# Patient Record
Sex: FEMALE | HISPANIC OR LATINO | Employment: FULL TIME | URBAN - METROPOLITAN AREA
[De-identification: names, ages, dates, MRNs, and addresses within clinical notes are randomized per-mention and may not be internally consistent; named-entity substitution may affect disease eponyms.]

---

## 2017-09-19 ENCOUNTER — OCCUPATIONAL MEDICINE (OUTPATIENT)
Dept: OCCUPATIONAL MEDICINE | Facility: CLINIC | Age: 32
End: 2017-09-19
Payer: COMMERCIAL

## 2017-09-19 ENCOUNTER — APPOINTMENT (OUTPATIENT)
Dept: URGENT CARE | Facility: CLINIC | Age: 32
End: 2017-09-19
Payer: COMMERCIAL

## 2017-09-19 ENCOUNTER — HOSPITAL ENCOUNTER (OUTPATIENT)
Facility: MEDICAL CENTER | Age: 32
End: 2017-09-19
Attending: PREVENTIVE MEDICINE
Payer: COMMERCIAL

## 2017-09-19 VITALS
DIASTOLIC BLOOD PRESSURE: 62 MMHG | WEIGHT: 99 LBS | OXYGEN SATURATION: 100 % | RESPIRATION RATE: 14 BRPM | BODY MASS INDEX: 19.44 KG/M2 | TEMPERATURE: 98.4 F | HEART RATE: 74 BPM | HEIGHT: 60 IN | SYSTOLIC BLOOD PRESSURE: 104 MMHG

## 2017-09-19 DIAGNOSIS — Z02.1 PRE-EMPLOYMENT DRUG SCREENING: ICD-10-CM

## 2017-09-19 DIAGNOSIS — Z77.21 EXPOSURE TO BLOOD OR BODY FLUID: ICD-10-CM

## 2017-09-19 LAB
ALBUMIN SERPL BCP-MCNC: 4.3 G/DL (ref 3.2–4.9)
ALBUMIN/GLOB SERPL: 1.3 G/DL
ALP SERPL-CCNC: 64 U/L (ref 30–99)
ALT SERPL-CCNC: 22 U/L (ref 2–50)
AMP AMPHETAMINE: NORMAL
ANION GAP SERPL CALC-SCNC: 9 MMOL/L (ref 0–11.9)
AST SERPL-CCNC: 29 U/L (ref 12–45)
BILIRUB SERPL-MCNC: 0.5 MG/DL (ref 0.1–1.5)
BUN SERPL-MCNC: 29 MG/DL (ref 8–22)
CALCIUM SERPL-MCNC: 9.8 MG/DL (ref 8.5–10.5)
CHLORIDE SERPL-SCNC: 104 MMOL/L (ref 96–112)
CO2 SERPL-SCNC: 26 MMOL/L (ref 20–33)
COC COCAINE: NORMAL
CREAT SERPL-MCNC: 0.64 MG/DL (ref 0.5–1.4)
ERYTHROCYTE [DISTWIDTH] IN BLOOD BY AUTOMATED COUNT: 47.8 FL (ref 35.9–50)
GFR SERPL CREATININE-BSD FRML MDRD: >60 ML/MIN/1.73 M 2
GLOBULIN SER CALC-MCNC: 3.4 G/DL (ref 1.9–3.5)
GLUCOSE SERPL-MCNC: 88 MG/DL (ref 65–99)
HBV CORE AB SERPL QL IA: NEGATIVE
HBV SURFACE AB SERPL IA-ACNC: >1000 MIU/ML (ref 0–10)
HBV SURFACE AG SER QL: NEGATIVE
HCT VFR BLD AUTO: 41.9 % (ref 37–47)
HCV AB SER QL: NEGATIVE
HGB BLD-MCNC: 14 G/DL (ref 12–16)
HIV 1+2 AB+HIV1 P24 AG SERPL QL IA: NON REACTIVE
INT CON NEG: NORMAL
INT CON POS: NORMAL
MCH RBC QN AUTO: 33.2 PG (ref 27–33)
MCHC RBC AUTO-ENTMCNC: 33.4 G/DL (ref 33.6–35)
MCV RBC AUTO: 99.3 FL (ref 81.4–97.8)
MET METHAMPHETAMINES: NORMAL
OPI OPIATES: NORMAL
PCP PHENCYCLIDINE: NORMAL
PLATELET # BLD AUTO: 235 K/UL (ref 164–446)
PMV BLD AUTO: 11.4 FL (ref 9–12.9)
POC DRUG COMMENT 753798-OCCUPATIONAL HEALTH: NEGATIVE
POTASSIUM SERPL-SCNC: 3.3 MMOL/L (ref 3.6–5.5)
PROT SERPL-MCNC: 7.7 G/DL (ref 6–8.2)
RBC # BLD AUTO: 4.22 M/UL (ref 4.2–5.4)
SODIUM SERPL-SCNC: 139 MMOL/L (ref 135–145)
THC: NORMAL
WBC # BLD AUTO: 6.4 K/UL (ref 4.8–10.8)

## 2017-09-19 PROCEDURE — 86706 HEP B SURFACE ANTIBODY: CPT

## 2017-09-19 PROCEDURE — 86703 HIV-1/HIV-2 1 RESULT ANTBDY: CPT | Performed by: PREVENTIVE MEDICINE

## 2017-09-19 PROCEDURE — 85025 COMPLETE CBC W/AUTO DIFF WBC: CPT | Performed by: PREVENTIVE MEDICINE

## 2017-09-19 PROCEDURE — 86803 HEPATITIS C AB TEST: CPT

## 2017-09-19 PROCEDURE — 87340 HEPATITIS B SURFACE AG IA: CPT | Performed by: PREVENTIVE MEDICINE

## 2017-09-19 PROCEDURE — 86704 HEP B CORE ANTIBODY TOTAL: CPT | Performed by: PREVENTIVE MEDICINE

## 2017-09-19 PROCEDURE — 80053 COMPREHEN METABOLIC PANEL: CPT | Performed by: PREVENTIVE MEDICINE

## 2017-09-19 PROCEDURE — 86704 HEP B CORE ANTIBODY TOTAL: CPT

## 2017-09-19 PROCEDURE — 80053 COMPREHEN METABOLIC PANEL: CPT

## 2017-09-19 PROCEDURE — 90471 IMMUNIZATION ADMIN: CPT | Performed by: PREVENTIVE MEDICINE

## 2017-09-19 PROCEDURE — 90715 TDAP VACCINE 7 YRS/> IM: CPT | Performed by: PREVENTIVE MEDICINE

## 2017-09-19 PROCEDURE — 99203 OFFICE O/P NEW LOW 30 MIN: CPT | Mod: 25 | Performed by: PREVENTIVE MEDICINE

## 2017-09-19 PROCEDURE — 80305 DRUG TEST PRSMV DIR OPT OBS: CPT | Performed by: PREVENTIVE MEDICINE

## 2017-09-19 PROCEDURE — 87340 HEPATITIS B SURFACE AG IA: CPT

## 2017-09-19 PROCEDURE — 86706 HEP B SURFACE ANTIBODY: CPT | Performed by: PREVENTIVE MEDICINE

## 2017-09-19 PROCEDURE — 85027 COMPLETE CBC AUTOMATED: CPT

## 2017-09-19 PROCEDURE — 86803 HEPATITIS C AB TEST: CPT | Performed by: PREVENTIVE MEDICINE

## 2017-09-19 PROCEDURE — 87389 HIV-1 AG W/HIV-1&-2 AB AG IA: CPT

## 2017-09-19 NOTE — LETTER
[address],   [address] [place] [address]  Phone: [phone] - Fax: [phone]        Occupational Health [facility] Progress Report and Disability Certification  Date of Service: 9/19/2017   No Show:  No  Date / Time of Next Visit: 9/21/2017@9:50 AM   Claim Information   Patient Name: Maricruz Archuleta  Claim Number:     Employer:   [employer] Date of Injury: 9/19/2017     Insurer / TPA: [employer]  ID / SSN:     Occupation: Dental Assistant  Diagnosis: The encounter diagnosis was Exposure to blood or body fluid.    Medical Information   Related to Industrial Injury? Yes    Subjective Complaints:  DOI 9/19/2017: Patient was removing softliner from the denture and accidentally cut her left middle finger. Patient did not note any visible blood in mouth. She states she did bleed a little bit and was transferred early. She states the wound is most improved and has minimal pain at this point. Patient is unsure of source status but she states that the source is agreed to be tested and stated that they will be in tomorrow for the blood draw.    Objective Findings: Left Middle Finger: Small well healing laceration of the distal tip of the finger. No bleeding, swelling or erythema.  Constitutional: She appears well-developed and well-nourished.   HENT: Normocephalic and atraumatic. EOM are normal. No scleral icterus.   Cardiovascular: Normal rate, regular rhythm and normal heart sounds.    Pulmonary/Chest: Effort normal and breath sounds normal. No respiratory distress.   Abdominal: Bowel sounds are normal. There is no tenderness.   Neurological: She is alert and oriented to person, place, and time.   Skin: Skin is warm and dry.   Psychiatric: She has a normal mood and affect. Her behavior is normal.      Pre-Existing Condition(s):     Assessment:   Initial Visit    Status: Additional Care Required  Permanent Disability:No    Plan:        Diagnostics:      Comments:  Source currently unknown status, but agreed to be tested and is supposed to come in tomorrow  Low risk exposure, risks and benefits of HIV prophylaxis was discussed, prophylaxis was not recommended and patient amenable to this.  Baseline labs drawn    Will follow CDC protocol for monitoring for Hep C and HIV, follow up labs at 6 weeks, 3 months and 6 months\  Updated Tdap, unsure of last vaccine  Follow up Thursday for lab results    Disability Information   Status: Released to Full Duty    From:  9/19/2017  Through: 9/21/2017 Restrictions are:     Physical Restrictions   Sitting:    Standing:    Stooping:    Bending:      Squatting:    Walking:    Climbing:    Pushing:      Pulling:    Other:    Reaching Above Shoulder (L):   Reaching Above Shoulder (R):       Reaching Below Shoulder (L):    Reaching Below Shoulder (R):      Not to exceed Weight Limits   Carrying(hrs):   Weight Limit(lb):   Lifting(hrs):   Weight  Limit(lb):     Comments:      Repetitive Actions   Hands: i.e. Fine Manipulations from Grasping:     Feet: i.e. Operating Foot Controls:     Driving / Operate Machinery:     Physician Name: Oli Sarabia D.O. Physician Signature: [email or web address] AUGUSTINE D.O. e-Signature: Dr. Paddy Lao, Medical Director   Clinic Name / Location: [address],   [address] [place] [address] Clinic Phone Number: Dept: [phone]   Appointment Time: 10:20 Am Visit Start Time: 9:50 AM   Check-In Time:  9:42 Am Visit Discharge Time:  10:20 AM   Original-Treating Physician or Chiropractor    Page 2-Insurer/TPA    Page 3-Employer    Page 4-Employee

## 2017-09-19 NOTE — LETTER
EMPLOYEE’S CLAIM FOR COMPENSATION/ REPORT OF INITIAL TREATMENT  FORM C-4    EMPLOYEE’S CLAIM - PROVIDE ALL INFORMATION REQUESTED   First Name  Maricruz Last Name  Geremias Birthdate                    1985                Sex  female Claim Number   Home Address  [address] Age  31 y.o. 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 (if applicable)  Removing Den[place] softliner  form denture with dental explorer    How did this injury or occupational disease occur? (Be specific an answer in detail. Use additional sheet if necessary)  Removing dental softliner from denture with dental explorer withn I accidently missed and cut my middle finger on left hand   If you believe that you have an occupational disease, when did you first have knowledge of the disability and it relationship to your employment?  n/a Witnesses to the Accident  n/a      Nature of Injury or Occupational Disease  Puncture  Part(s) of Body Injured or Affected  Hand (L), Finger (L),     I certify that the above is true and correct to the best of my knowledge and that I  have provided this information in order to obtain the benefits of [place]).  I hereby authorize any physician, chiropractor, surgeon, practitioner, or other person, any hospital, including [facility] or [facility] hospital, any medical service organization, any insurance company, or other institution or organization to release to each other, any medical or other information, including benefits paid or payable, pertinent to this injury or disease, except information relative to diagnosis, treatment and/or counseling for AIDS, psychological conditions, alcohol or controlled substances, for which I must give specific authorization.  A Photostat of this authorization shall be as valid as the original.     Date   Place   Employee’s Signature   THIS REPORT MUST BE COMPLETED AND MAILED WITHIN 3 WORKING DAYS OF TREATMENT   Place  [facility]  Name of [facility]   Date  9/19/2017 Diagnosis  (Z77.21) Exposure to blood or body fluid Is there evidence the injured employee was under the influence of alcohol and/or another controlled substance at the time of accident?   Hour  9:50 AM Description of Injury or Disease  The encounter diagnosis was Exposure to blood or body fluid. No   Treatment  Tdap  Have you advised the patient to remain off work five days or more? No   X-Ray Findings      If Yes   From Date  To Date      From information given by the employee, together with medical evidence, can you directly connect this injury or occupational disease as job incurred?  Yes If No Full Duty  Yes Modified Duty      Is additional medical care by a physician indicated?  Yes If Modified Duty, Specify any Limitations / Restrictions      Do you know of any previous injury or disease contributing to this condition or occupational disease?                            No   Date  9/19/2017 Print  "Doctor’s Name Oli Sarabia D.O. I certify the employer’s copy of  this form was mailed on:   Address  [address],   [address] Insurer’s Use Only     [facility] Zip  [address]    Provider’s Tax ID Number  [id number]  Telephone  Dept: [phone]        e-SignTAYLOROLI D.O.   e-Signature: Dr. Paddy Lao, Medical Director Degree  DO        ORIGINAL-TREATING PHYSICIAN OR CHIROPRACTOR    PAGE 2-INSURER/TPA    PAGE 3-EMPLOYER    PAGE 4-EMPLOYEE             Form C-4 (rev10/07)              BRIEF DESCRIPTION OF RIGHTS AND BENEFITS  (Pursuant to [place] 616C.050)    Notice of Injury or Occupational Disease (Incident Report Form C-1): If an injury or occupational disease (OD) arises out of and in the  course of employment, you must provide written notice to your employer as soon as practicable, but no later than 7 days after the accident or  OD. Your employer shall maintain a sufficient supply of the required forms.    Claim for Compensation (Form C-4): If medical treatment is sought, the form C-4 is available at the place of initial treatment. A completed  \"Claim for Compensation\" (Form C-4) must be filed within 90 days after an accident or OD. The treating physician or chiropractor must,  within 3 working days after treatment, complete and mail to the employer, the employer's insurer and third-party , the Claim for  Compensation.    Medical Treatment: If you require medical treatment for your on-the-job injury or OD, you may be required to select a physician or  chiropractor from a list provided by your workers’ compensation insurer, if it has contracted with an Organization for Managed Care (MCO) or  Preferred Provider Organization (PPO) or providers of health care. If your employer has not entered into a contract with an MCO or PPO, you  may select a physician or chiropractor from the Panel of Physicians and Chiropractors. Any medical costs related to your industrial injury or  OD will be " paid by your insurer.    Temporary Total Disability (TTD): If your doctor has certified that you are unable to work for a period of at least 5 consecutive days, or 5  cumulative days in a 20-day period, or places restrictions on you that your employer does not accommodate, you may be entitled to TTD  compensation.    Temporary Partial Disability (TPD): If the wage you receive upon reemployment is less than the compensation for TTD to which you are  entitled, the insurer may be required to pay you TPD compensation to make up the difference. TPD can only be paid for a maximum of 24  months.    Permanent Partial Disability (PPD): When your medical condition is stable and there is an indication of a PPD as a result of your injury or  OD, within 30 days, your insurer must arrange for an evaluation by a rating physician or chiropractor to determine the degree of your PPD. The  amount of your PPD award depends on the date of injury, the results of the PPD evaluation and your age and wage.    Permanent Total Disability (PTD): If you are medically certified by a treating physician or chiropractor as permanently and totally disabled  and have been granted a PTD status by your insurer, you are entitled to receive monthly benefits not to exceed 66 2/3% of your average  monthly wage. The amount of your PTD payments is subject to reduction if you previously received a PPD award.    Vocational Rehabilitation Services: You may be eligible for vocational rehabilitation services if you are unable to return to the job due to a  permanent physical impairment or permanent restrictions as a result of your injury or occupational disease.    Transportation and Per Omid Reimbursement: You may be eligible for travel expenses and per omid associated with medical treatment.    Reopening: You may be able to reopen your claim if your condition worsens after claim closure.    Appeal Process: If you disagree with a written determination issued by  the insurer or the insurer does not respond to your request, you may  appeal to the Department of Administration, , by following the instructions contained in your determination letter. You must  appeal the determination within 70 days from the date of the determination letter at [address], [address], [place], [place]  [address], or [address], [address], [place], [place] [address]. If you disagree with the  decision, you may appeal to the  Department of Administration, . You must file your appeal within 30 days from the date of the  decision  letter at [address], [address], [place], [place] [address], or [address], [place], [place] [address]. If you  disagree with a decision of an , you may file a petition for judicial review with the District Court. You must do so within 30  days of the Appeal Officer’s decision. You may be represented by an  at your own expense or you may contact the [facility] for possible  representation.    [place]  for Injured Workers (NAIW): If you disagree with a  decision, you may request that NAIW represent you  without charge at an  Hearing. For information regarding denial of benefits, you may contact the [facility] at: [address], [address], [place] [address], [phone], or [address], [place] [address], [phone]    To File a Complaint with the Division: If you wish to file a complaint with the  of the Division of Industrial Relations (DIR),  please contact the Workers’ Compensation Section, [address], [place], [place] [address], telephone [phone]) [phone], or  [address], [place] [address], telephone [phone].    For assistance with Workers’ Compensation Issues: you may contact the Office of the Governor  Consumer Health Assistance, [address], [address], [place] [address], Toll Free 1-929.457.9961, Web site: http://govcha.Critical access [email or web address]., E-mail  [email or web address].                                                                                                                                                                                                                                   __________________________________________________________________                                                                   _________________                Employee Name / Signature                                                                                                                                                       Date                                                                                                                                                                                                     D-2 (rev. 10/07)

## 2017-09-19 NOTE — PROGRESS NOTES
Subjective:      Maricruz Archuleta is a 31 y.o. female who presents with Follow-Up (NEW WC DOI 09/19/2017 - [facility] - ROOM 3)      DOI 9/19/2017: Patient was removing softliner from the denture and accidentally cut her left middle finger. Patient did not note any visible blood in mouth. She states she did bleed a little bit and was transferred early. She states the wound is most improved and has minimal pain at this point. Patient is unsure of source status but she states that the source is agreed to be tested and stated that they will be in tomorrow for the blood draw.      HPI    ROS  ROS: All systems were reviewed on intake form, form was reviewed and signed. See scanned documents in media. Pertinent positives and negatives included in HPI.    PMH: No pertinent past medical history to this problem  MEDS: Medications were reviewed in Epic  ALLERGIES:   Allergies   Allergen Reactions   • Food      Red vinegar hives on skin if she touches it   • Other Environmental      Seasonal allergies     SOCHX: Works as a dental assistant   FH: No pertinent family history to this problem       Objective:     /62   Pulse 74   Temp 36.9 °C (98.4 °F)   Resp 14   Ht 1.524 m (5')   Wt 44.9 kg (99 lb)   SpO2 100%   BMI 19.33 kg/m²      Physical Exam    Left Middle Finger: Small well healing laceration of the distal tip of the finger. No bleeding, swelling or erythema.  Constitutional: She appears well-developed and well-nourished.   HENT: Normocephalic and atraumatic. EOM are normal. No scleral icterus.   Cardiovascular: Normal rate, regular rhythm and normal heart sounds.    Pulmonary/Chest: Effort normal and breath sounds normal. No respiratory distress.   Abdominal: Bowel sounds are normal. There is no tenderness.   Neurological: She is alert and oriented to person, place, and time.   Skin: Skin is warm and dry.   Psychiatric: She has a normal mood and affect. Her behavior is normal.          Assessment/Plan:      1. Exposure to blood or body fluid  - EXPOSED PERSON-SOURCE PT POSITIVE OR UNK (BLOOD & BODY FLUID EXPOSURE); Future  - Tdap =>8yo IM    Source currently unknown status, but agreed to be tested and is supposed to come in tomorrow  Low risk exposure, risks and benefits of HIV prophylaxis was discussed, prophylaxis was not recommended and patient amenable to this.  Baseline labs drawn  Will follow CDC protocol for monitoring for Hep C and HIV, follow up labs at 6 weeks, 3 months and 6 months\  Updated Tdap, unsure of last vaccine  Follow up Thursday for lab results

## 2017-09-21 ENCOUNTER — OCCUPATIONAL MEDICINE (OUTPATIENT)
Dept: OCCUPATIONAL MEDICINE | Facility: CLINIC | Age: 32
End: 2017-09-21
Payer: COMMERCIAL

## 2017-09-21 VITALS
HEIGHT: 60 IN | BODY MASS INDEX: 19.44 KG/M2 | DIASTOLIC BLOOD PRESSURE: 62 MMHG | SYSTOLIC BLOOD PRESSURE: 100 MMHG | WEIGHT: 99 LBS | OXYGEN SATURATION: 97 % | RESPIRATION RATE: 16 BRPM | HEART RATE: 55 BPM | TEMPERATURE: 97.9 F

## 2017-09-21 DIAGNOSIS — Z77.21 EXPOSURE TO BLOOD OR BODY FLUID: ICD-10-CM

## 2017-09-21 PROCEDURE — 99212 OFFICE O/P EST SF 10 MIN: CPT | Performed by: PREVENTIVE MEDICINE

## 2017-09-21 NOTE — PROGRESS NOTES
Subjective:      Maricruz Archuleta is a 31 y.o. female who presents with Follow-Up ([facility] feels good)      DOI 9/19/2017: Patient was removing softliner from the denture and accidentally cut her left middle finger. Patient did not note any visible blood in mouth. She states she did bleed a little bit. Wound improved. Source was tested the same day. Asymptomatic     HPI    ROS       Objective:     /62   Pulse (!) 55   Temp 36.6 °C (97.9 °F)   Resp 16   Ht 1.524 m (5')   Wt 44.9 kg (99 lb)   SpO2 97%   BMI 19.33 kg/m²      Physical Exam    Constitutional: She appears well-developed and well-nourished. .    Pulmonary/Chest: Effort normal.  Neurological: She is alert and oriented to person, place, and time.   Skin: Skin is warm and dry.   Psychiatric: She has a normal mood and affect. Her behavior is normal.       Assessment/Plan:     1. Exposure to blood or body fluid  Source came in yesterday and was negative for HIV, hepatitis C and B  Baseline labs were negative. Retains immunity to hepatitis B  Per CDC guidelines no further follow-up or testing recommended.  Full duty  Released from care

## 2017-09-21 NOTE — LETTER
[address],   [address] [place] [address]  Phone:  [phone] - Fax:  [phone]   Occupational Health [facility] Progress Report and Disability Certification  Date of Service: 9/21/2017   No Show:  No  Date / Time of Next Visit:  MMI   Claim Information   Patient Name: Maricruz Archuleta  Claim Number:     Employer:   [employer] Date of Injury: 9/19/2017     Insurer / TPA: [employer]  ID / SSN:     Occupation: Dental Assistant  Diagnosis: The encounter diagnosis was Exposure to blood or body fluid.    Medical Information   Related to Industrial Injury? Yes    Subjective Complaints:  DOI 9/19/2017: Patient was removing softliner from the denture and accidentally cut her left middle finger. Patient did not note any visible blood in mouth. She states she did bleed a little bit. Wound improved. Source was tested the same day. Asymptomatic   Objective Findings: Constitutional: She appears well-developed and well-nourished. .    Pulmonary/Chest: Effort normal.  Neurological: She is alert and oriented to person, place, and time.   Skin: Skin is warm and dry.   Psychiatric: She has a normal mood and affect. Her behavior is normal.   Pre-Existing Condition(s):     Assessment:   Condition Improved    Status: Discharged /  MMI  Permanent Disability:No    Plan:      Diagnostics:      Comments:  Source came in yesterday and was negative for HIV, hepatitis C and B  Baseline labs were negative. Retains immunity to hepatitis B  Per CDC guidelines no further follow-up or testing recommended.  Full duty  Released from care    Disability Information   Status: Released to Full Duty    From:  9/21/2017  Through:   Restrictions are:     Physical Restrictions   Sitting:    Standing:    Stooping:    Bending:      Squatting:    Walking:    Climbing:    Pushing:      Pulling:    Other:    Reaching Above Shoulder (L):   Reaching Above Shoulder (R):      Reaching Below Shoulder (L):    Reaching Below Shoulder (R):      Not to exceed Weight Limits   Carrying(hrs):   Weight Limit(lb):   Lifting(hrs):   Weight  Limit(lb):     Comments:      Repetitive Actions   Hands: i.e. Fine Manipulations from Grasping:     Feet: i.e. Operating Foot Controls:     Driving / Operate Machinery:     Physician Name: Oli Sarabia D.O. Physician Signature: OLI Kemp D.O. e-Signature: Dr. Paddy Lao, Medical Director   Clinic Name / Location: [address],   [address]  [place] [address] Clinic Phone Number: Dept: [phone]   Appointment Time: 9:50 Am Visit Start Time: 9:43 AM   Check-In Time:  9:42 Am Visit Discharge Time:  10:05 AM   Original-Treating Physician or Chiropractor    Page 2-Insurer/TPA    Page 3-Employer    Page 4-Employee

## 2018-11-01 ENCOUNTER — OCCUPATIONAL MEDICINE (OUTPATIENT)
Dept: URGENT CARE | Facility: CLINIC | Age: 33
End: 2018-11-01
Payer: COMMERCIAL

## 2018-11-01 VITALS
OXYGEN SATURATION: 98 % | WEIGHT: 103 LBS | TEMPERATURE: 99 F | BODY MASS INDEX: 20.22 KG/M2 | HEIGHT: 60 IN | SYSTOLIC BLOOD PRESSURE: 98 MMHG | HEART RATE: 60 BPM | DIASTOLIC BLOOD PRESSURE: 60 MMHG

## 2018-11-01 DIAGNOSIS — S09.90XA INJURY OF HEAD, INITIAL ENCOUNTER: ICD-10-CM

## 2018-11-01 DIAGNOSIS — S00.83XA FOREHEAD CONTUSION, INITIAL ENCOUNTER: Primary | ICD-10-CM

## 2018-11-01 LAB
AMP AMPHETAMINE: NORMAL
COC COCAINE: NORMAL
INT CON NEG: NORMAL
INT CON POS: NORMAL
MET METHAMPHETAMINES: NORMAL
OPI OPIATES: NORMAL
PCP PHENCYCLIDINE: NORMAL
POC DRUG COMMENT 753798-OCCUPATIONAL HEALTH: NEGATIVE
THC: NORMAL

## 2018-11-01 PROCEDURE — 99204 OFFICE O/P NEW MOD 45 MIN: CPT | Mod: 29 | Performed by: PHYSICIAN ASSISTANT

## 2018-11-01 PROCEDURE — 80305 DRUG TEST PRSMV DIR OPT OBS: CPT | Mod: 29 | Performed by: PHYSICIAN ASSISTANT

## 2018-11-01 ASSESSMENT — ENCOUNTER SYMPTOMS
DIARRHEA: 0
DOUBLE VISION: 0
CHILLS: 0
FOCAL WEAKNESS: 0
WEAKNESS: 0
HEADACHES: 1
SPEECH CHANGE: 0
FEVER: 0
DIZZINESS: 0
TINGLING: 0
BLURRED VISION: 0
VOMITING: 0
SENSORY CHANGE: 0
LOSS OF CONSCIOUSNESS: 0
COUGH: 0
ABDOMINAL PAIN: 0
CONSTIPATION: 0
SEIZURES: 0
TREMORS: 0
NAUSEA: 0

## 2018-11-01 NOTE — PATIENT INSTRUCTIONS
Returning to Sports and Activities After a Concussion, Adult  A concussion is a brain injury from a direct hit (blow) to the head or body. This blow causes the brain to shake quickly back and forth inside the skull. This can damage brain cells and cause chemical changes in the brain.  Anyone can have a concussion. It is common to get a concussion while playing sports or doing athletic activities. Concussions can also happen outside of sports, such as falling and hitting your head. If you have a concussion, you may have temporary problems with brain functions that involve memory, speech, balance, and coordination. You also may also feel dizzy or nauseous. You may have trouble thinking clearly. Symptoms usually go away in a couple of weeks. Sometimes they last longer.  It is important to wait to return to activity until your symptoms are completely gone and a health care provider says it is safe to do so. You may also need to take time away from work or other activities that require concentration, depending on how severe your concussion is. Going back too soon increases the risk of another concussion. Concussions can have serious effects on your brain. People who have more than one concussion are at greater risk of having chronic headaches and problems with learning.  When can I return to sports or other activities?  You should stop participating in an activity immediately after you hit your head or a concussion is suspected. You need to rest physically and mentally. You should also be monitored carefully by another adult. How quickly you can return to sports and other activities depends on:  · Your age.  · The severity of your concussion.  · Your health before the injury.  · Whether you have had a previous concussion.  How should I gradually return to sports or other activities?  You should not resume your sports or activities until you are symptom-free without medicine for at least 24 hours. Your health care  provider will determine when your symptoms are completely gone and when it is safe for you to practice and play sports again.  It is important that you return to sports gradually. Do not try to do too much too soon. Gradually advance through the following activity levels to return to sports:  1. Begin with only light aerobic activity to increase your heart rate. You may bike, walk, or jog for up to 10 minutes. Do not jump, run, or lift weights.  2. Get moderate physical activity with some head and body movements. Running short distances, fast jogging, using a stationary bike, and moderate-intensity weight lifting are okay.  3. Participate in high-intensity exercise without physical contact.  4. Return to your normal practice routine, which may include full contact.  5. Return to play in games, matches, or other competitions.  Some people can progress quickly through these levels. Other people will need several days to go from one level to the next. Do not move on to the next level until you have been symptom-free for at least 24 hours following the previous level. Symptoms to watch for include:  · Fatigue.  · Headache.  · Problems with balance, coordination, or memory.  If you notice any of these warning signs during exercise or other physical activities, rest for at least 24 hours or until the symptoms go away. You can then resume activity. Start at the activity level that you were on before your symptoms began.  What symptoms are important to report to my health care provider?  Concussion symptoms may not appear right away. They could also get worse at any time. It is important to let your health care provider know if you have any new or worsening symptoms, such as:  · Drowsiness or fatigue.  · Severe or persistent headache.  · Memory loss.  · Confusion.  · Dizziness.  · Trouble concentrating.  · Nausea and vomiting.  · Loss of consciousness.  · Weakness or numbness.  · Problems with balance and  coordination.  · Slurred speech.  · Seizures.  · Trouble recognizing faces or places.  · Irritability.  · Changes in sleep habits.  · Depression.  · Personality changes.  · Inability to remember events before or after the injury.  Certain health issues may make your recovery from a concussion take longer. Let your health care provider know if you have a history of:  · Migraines.  · Depression.  · Mood disorders.  · Anxiety.  · A developmental disorder or a brain-related (neurobehavioral) disorder, such as ADHD.  What are some questions to ask my health care provider?  When you have a concussion, learning as much as you can about your injury can help you protect your long-term health. Ask your health care provider the following questions:  · Is it safe for me to return to sports or other physical activities?  · What are the short-term and long-term consequences of my injury?  · Should I consider not playing sports anymore?  · What happens if I get another concussion?  · What are the warning signs of a concussion?  · Could I have a concussion without knowing it?  · When should I go to the emergency room?  · How can I prevent another concussion from happening?  · How might the concussion affect my professional life?  · Should I limit how much time I watch TV, play video games, or use a computer?  · Do I need to take time away from work?  · Do I need more sleep than normal?  · Do I need medicine for a concussion?  · Could I have problems with memory or learning?  This information is not intended to replace advice given to you by your health care provider. Make sure you discuss any questions you have with your health care provider.  Document Released: 04/10/2017 Document Revised: 05/25/2017 Document Reviewed: 01/01/2017  Elsevier Interactive Patient Education © 2017 Elsevier Inc.      Facial or Scalp Contusion  A facial or scalp contusion is a deep bruise on the face or head. Contusions happen when an injury causes  bleeding under the skin. Signs of bruising include pain, puffiness (swelling), and discolored skin. The contusion may turn blue, purple, or yellow.  Follow these instructions at home:  · Only take medicines as told by your doctor.  · Put ice on the injured area.  ¨ Put ice in a plastic bag.  ¨ Place a towel between your skin and the bag.  ¨ Leave the ice on for 20 minutes, 2-3 times a day.  Contact a doctor if:  · You have bite problems.  · You have pain when chewing.  · You are worried about your face not healing normally.  Get help right away if:  · You have severe pain or a headache and medicine does not help.  · You are very tired or confused, or your personality changes.  · You throw up (vomit).  · You have a nosebleed that will not stop.  · You see two of everything (double vision) or have blurry vision.  · You have fluid coming from your nose or ear.  · You have problems walking or using your arms or legs.  This information is not intended to replace advice given to you by your health care provider. Make sure you discuss any questions you have with your health care provider.  Document Released: 12/06/2012 Document Revised: 08/24/2017 Document Reviewed: 07/31/2014  [employer] Interactive Patient Education © 2017 Elsevier Inc.

## 2018-11-01 NOTE — LETTER
EMPLOYEE’S CLAIM FOR COMPENSATION/ REPORT OF INITIAL TREATMENT  FORM C-4    EMPLOYEE’S CLAIM - PROVIDE ALL INFORMATION REQUESTED   First Name  Maricruz Last Name  Geremias Birthdate                    1985                Sex  female Claim Number   Home Address  [address] Age  33 y.o. 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 (if applicable)  Turning off/on x-ray machine, as I walked away walked into door    How did this injury or occupational disease occur? (Be specific an answer in detail. Use additional sheet if necessary)  Walked into door   If you believe that you have an occupational disease, when did you first have knowledge of the disability and it relationship to your employment?  N/A Witnesses to the Accident  N/A      Nature of Injury or Occupational Disease  Contusion  Part(s) of Body Injured or Affected  Skull, N/A, N/A    I certify that the above is true and correct to the best of my knowledge and that I have provided this information in order to obtain the benefits of [place]  I hereby authorize any physician, chiropractor, surgeon, practitioner, or other person, any hospital, including [facility] or [facility] hospital, any medical service organization, any insurance company, or other institution or organization to release to each other, any medical or other information, including benefits paid or payable, pertinent to this injury or disease, except information relative to diagnosis, treatment and/or counseling for AIDS, psychological conditions, alcohol or controlled substances, for which I must give specific authorization.  A Photostat of this authorization shall be as valid as the original.     Date   Place   Employee’s Signature   THIS REPORT MUST BE COMPLETED AND MAILED WITHIN 3 WORKING DAYS OF TREATMENT   Place  [facility]  Name of Facility  [facility]   Date  11/1/2018 Diagnosis  (S00.83XA) Forehead contusion, initial encounter  (primary encounter diagnosis)  (S09.90XA) Injury of head, initial encounter Is there evidence the injured employee was under the influence of alcohol and/or another controlled substance at the time of accident?   Hour  1:47 PM Description of Injury or Disease  The primary encounter diagnosis was Forehead contusion, initial encounter. A diagnosis of Injury of head, initial encounter was also pertinent to this visit. No   Treatment  Patient advised to ice area and OTC ibuprofen as needed.  Have you advised the patient to remain off work five days or more? No   X-Ray Findings      If Yes   From Date  To Date      From information given by the employee, together with medical evidence, can you directly connect this injury or occupational disease as job incurred?  Yes If No Full Duty  No Modified Duty  Yes   Is additional medical care by a physician indicated?  Yes If Modified Duty, Specify any Limitations / Restrictions  Please see D 39 for  "restriction   Do you know of any previous injury or disease contributing to this condition or occupational disease?                            No   Date  11/1/2018 Print Doctor’s Name Faustina Dotson P.A.-C. I certify the employer’s copy of  this form was mailed on:   Address  [address] [address] Insurer’s Use Only     [facility] [place]  [address]    Provider’s Tax ID Number  [id number] Telephone  Dept: [phone]        e-FAUSTINA Espinoza P.A.-C.   e-Signature: Dr. Paddy Lao, Medical Director Degree  PAULINO        ORIGINAL-TREATING PHYSICIAN OR CHIROPRACTOR    PAGE 2-INSURER/TPA    PAGE 3-EMPLOYER    PAGE 4-EMPLOYEE             Form C-4 (rev10/07)              BRIEF DESCRIPTION OF RIGHTS AND BENEFITS  (Pursuant to [place]RS 616C.050)    Notice of Injury or Occupational Disease (Incident Report Form C-1): If an injury or occupational disease (OD) arises out of and in the  course of employment, you must provide written notice to your employer as soon as practicable, but no later than 7 days after the accident or  OD. Your employer shall maintain a sufficient supply of the required forms.    Claim for Compensation (Form C-4): If medical treatment is sought, the form C-4 is available at the place of initial treatment. A completed  \"Claim for Compensation\" (Form C-4) must be filed within 90 days after an accident or OD. The treating physician or chiropractor must,  within 3 working days after treatment, complete and mail to the employer, the employer's insurer and third-party , the Claim for  Compensation.    Medical Treatment: If you require medical treatment for your on-the-job injury or OD, you may be required to select a physician or  chiropractor from a list provided by your workers’ compensation insurer, if it has contracted with an Organization for Managed Care (MCO) or  Preferred Provider Organization (PPO) or providers of health care. If your employer has not entered into a contract " with an MCO or PPO, you  may select a physician or chiropractor from the Panel of Physicians and Chiropractors. Any medical costs related to your industrial injury or  OD will be paid by your insurer.    Temporary Total Disability (TTD): If your doctor has certified that you are unable to work for a period of at least 5 consecutive days, or 5  cumulative days in a 20-day period, or places restrictions on you that your employer does not accommodate, you may be entitled to TTD  compensation.    Temporary Partial Disability (TPD): If the wage you receive upon reemployment is less than the compensation for TTD to which you are  entitled, the insurer may be required to pay you TPD compensation to make up the difference. TPD can only be paid for a maximum of 24  months.    Permanent Partial Disability (PPD): When your medical condition is stable and there is an indication of a PPD as a result of your injury or  OD, within 30 days, your insurer must arrange for an evaluation by a rating physician or chiropractor to determine the degree of your PPD. The  amount of your PPD award depends on the date of injury, the results of the PPD evaluation and your age and wage.    Permanent Total Disability (PTD): If you are medically certified by a treating physician or chiropractor as permanently and totally disabled  and have been granted a PTD status by your insurer, you are entitled to receive monthly benefits not to exceed 66 2/3% of your average  monthly wage. The amount of your PTD payments is subject to reduction if you previously received a PPD award.    Vocational Rehabilitation Services: You may be eligible for vocational rehabilitation services if you are unable to return to the job due to a  permanent physical impairment or permanent restrictions as a result of your injury or occupational disease.    Transportation and Per Omid Reimbursement: You may be eligible for travel expenses and per omid associated with medical  treatment.    Reopening: You may be able to reopen your claim if your condition worsens after claim closure.    Appeal Process: If you disagree with a written determination issued by the insurer or the insurer does not respond to your request, you may  appeal to the Department of Administration, , by following the instructions contained in your determination letter. You must  appeal the determination within 70 days from the date of the determination letter at [address], [address], [place], [place]  [address], or [address],  [address]. If you disagree with the  decision, you may appeal to the  Department of Administration, . You must file your appeal within 30 days from the date of the  decision  letter at [address], [address], [place], [place] [address], or [address], [facility] [address],  [address]. If you  disagree with a decision of an , you may file a petition for judicial review with the District Court. You must do so within 30  days of the Appeal Officer’s decision. You may be represented by an  at your own expense or you may contact the [facility] for possible  representation.    [place]  for Injured Workers (NAIW): If you disagree with a  decision, you may request that NAIW represent you  without charge at an  Hearing. For information regarding denial of benefits, you may contact the [facility] at: [address], [address], [place] [address], [phone]) [phone], or [address], [address], [place] [address], [phone]) [phone]    To File a Complaint with the Division: If you wish to file a complaint with the  of the Division of Industrial Relations (DIR),  please contact the Workers’ Compensation Section, [address], [address], [place], [place] [address], telephone (128) [phone], or  [address], [address], [place], [place] [address], telephone [phone]) [phone].    For assistance with Workers’ Compensation Issues: you may contact the Office of the Governor Consumer Health Assistance, [address] [place] [address], [address], [place], [place] [address], Toll Free 1-486.513.4679, Web site: http://Grabbed.[facility].nv., E-mail  [email or web address].                                                                                                                                                                                                                                   __________________________________________________________________                                                                   _________________                Employee Name / Signature                                                                                                                                                       Date                                                                                                                                                                                                     D-2 (rev. 10/07)

## 2018-11-01 NOTE — LETTER
[address] [place] [address]  Phone:  [phone] - Fax:  [phone]   [facility] Progress Report and Disability Certification  Date of Service: 11/1/2018   No Show:  No  Date / Time of Next Visit: 11/4/2018 @ 9:40AM   Claim Information   Patient Name: Maricruz Archuleta  Claim Number:     Employer:   [employer] Date of Injury:  11-1-18   Insurer / TPA: [employer]  ID / SSN:     Occupation:  Dental Assistant Diagnosis: The primary encounter diagnosis was Forehead contusion, initial encounter. A diagnosis of Injury of head, initial encounter was also pertinent to this visit.    Medical Information   Related to Industrial Injury? Yes    Subjective Complaints:  DOI 11/1/18: Walked into cabinet while powering off X-ray machine today around 11 AM. No LOC. No dizziness, lightheadedness, visual changes. No focal weakness. No previous head injury or concussion. Pt applied cold pack. Area immediately swelled. Pt not currently on any medication. Takes melatonin and a multivitamin intermittently no other medication or supplement.  Pt currently has a dull headache that feels like pressure.    Objective Findings: Physical Exam   Constitutional: She is oriented to person, place, and time. She appears well-developed and well-nourished. No distress.   HENT:   Head: Normocephalic and atraumatic.       Eyes: Pupils are equal, round, and reactive to light. Conjunctivae and EOM are normal.   Neck: Neck supple.   Cardiovascular: Normal rate and regular rhythm.    Pulmonary/Chest: Effort normal and breath sounds normal.   Neurological: She is alert and oriented to person, place, and time. She has normal strength. She is not disoriented. She displays no atrophy and no tremor. No cranial nerve deficit or sensory deficit. She exhibits normal muscle tone. She displays a negative Romberg sign. She displays no seizure activity. Coordination and gait  normal.   Skin: Skin is warm and dry.   Psychiatric: She has a normal mood and affect. Her behavior is normal.   Vitals reviewed.  L forehead: Small superficial contusion   Pre-Existing Condition(s): None   Assessment:   Initial Visit    Status: Additional Care Required  Permanent Disability:No    Plan:   Comments:OTC ibuprofen     Diagnostics:      Comments:       Disability Information   Status: Released to Restricted Duty    From:  11/1/2018  Through: 11/4/2018 Restrictions are: Temporary   Physical Restrictions   Sitting:    Standing:    Stooping:    Bending:      Squatting:    Walking:    Climbing:    Pushing:      Pulling:    Other:    Reaching Above Shoulder (L):   Reaching Above Shoulder (R):       Reaching Below Shoulder (L):    Reaching Below Shoulder (R):      Not to exceed Weight Limits   Carrying(hrs):   Weight Limit(lb):   Lifting(hrs):   Weight  Limit(lb):     Comments: Light duty: consider reducing screen time if headache symptoms persist.  Patient advised to ice area and OTC ibuprofen as needed.    Repetitive Actions   Hands: i.e. Fine Manipulations from Grasping:     Feet: i.e. Operating Foot Controls:     Driving / Operate Machinery:     Physician Name: Faustina Dotson P.A.-C. Physician Signature: FAUSTINA Kebede P.A.-C. e-Signature: Dr. Paddy Lao, Medical Director   Clinic Name / Location: [address] [address] Clinic Phone Number: Dept: [phone]   Appointment Time: 12:15 Pm Visit Start Time: 1:47 PM   Check-In Time:  12:22 Pm Visit Discharge Time:  2:42PM   Original-Treating Physician or Chiropractor    Page 2-Insurer/TPA    Page 3-Employer    Page 4-Employee

## 2018-11-04 ENCOUNTER — OCCUPATIONAL MEDICINE (OUTPATIENT)
Dept: URGENT CARE | Facility: CLINIC | Age: 33
End: 2018-11-04
Payer: COMMERCIAL

## 2018-11-04 VITALS
HEART RATE: 63 BPM | WEIGHT: 103 LBS | SYSTOLIC BLOOD PRESSURE: 112 MMHG | DIASTOLIC BLOOD PRESSURE: 62 MMHG | HEIGHT: 60 IN | RESPIRATION RATE: 14 BRPM | BODY MASS INDEX: 20.22 KG/M2 | TEMPERATURE: 98.4 F | OXYGEN SATURATION: 97 %

## 2018-11-04 DIAGNOSIS — S00.83XD FOREHEAD CONTUSION, SUBSEQUENT ENCOUNTER: ICD-10-CM

## 2018-11-04 PROCEDURE — 99213 OFFICE O/P EST LOW 20 MIN: CPT | Mod: 29 | Performed by: NURSE PRACTITIONER

## 2018-11-04 NOTE — PROGRESS NOTES
Subjective:      Maricruz Archuleta is a 33 y.o. female who presents with Follow-Up (Lt forhead bumped head on cabnet x 4 days. Pt states its better )      DOI 11/1/18: Walked into cabinet while powering off X-ray machine. No LOC. No dizziness, lightheadedness, visual changes. No focal weakness. No previous head injury or concussion. Swelling improved. Pt not currently on any medication. Pt reported dull headache one day after head injury that quickly resolved. She reports 100% improvement since injury.     HPI    Review of Systems   HENT:        Forehead contusion   All other systems reviewed and are negative.    Past Medical History:   Diagnosis Date   • Bowel habit changes     constipation   • Bronchitis    • Tuberculosis 2015    treated       Past Surgical History:   Procedure Laterality Date   • HEMORRHOIDECTOMY N/A 3/4/2016    Procedure: HEMORRHOIDECTOMY;  Surgeon: Kosta Rodrigues M.D.;  Location: SURGERY [facility];  Service:    • ANAL FISTULECTOMY N/A 3/4/2016    Procedure: ANAL FISTULECTOMY;  Surgeon: Kosta Rodrigues M.D.;  Location: SURGERY [facility];  Service:    • FISSURECTOMY N/A 3/4/2016    Procedure: FISSURECTOMY Possible;  Surgeon: Kosta Rodrigues M.D.;  Location: SURGERY [facility];  Service:    • PB REMOVAL OF TONSILS,<13 Y/O      at 13yrs of age      Social History     Social History   • Marital status: Single     Spouse name: N/A   • Number of children: N/A   • Years of education: N/A     Occupational History   • Not on file.     Social History Main Topics   • Smoking status: Former Smoker   • Smokeless tobacco: Never Used   • Alcohol use No      Comment: every weekend   • Drug use: No   • Sexual activity: Not on file     Other Topics Concern   • Not on file     Social History Narrative   • No narrative on file          Objective:     /62   Pulse 63   Temp 36.9 °C (98.4 °F)   Resp 14   Ht 1.524 m (5')   Wt 46.7 kg (103 lb)   SpO2 97%   BMI 20.12 kg/m²       Physical Exam   Constitutional: She is oriented to person, place, and time. Vital signs are normal. She appears well-developed and well-nourished.   HENT:   Head: Normocephalic and atraumatic.       Eyes: Pupils are equal, round, and reactive to light. EOM are normal.   Neck: Normal range of motion.   Cardiovascular: Normal rate and regular rhythm.    Pulmonary/Chest: Effort normal.   Musculoskeletal: Normal range of motion.   Neurological: She is alert and oriented to person, place, and time. She has normal strength. No cranial nerve deficit or sensory deficit.   Skin: Skin is warm and dry. Capillary refill takes less than 2 seconds.   Psychiatric: She has a normal mood and affect. Her speech is normal and behavior is normal. Thought content normal.   Vitals reviewed.      Neurological: She is alert and oriented to person, place, and time. She has normal strength. Coordination and gait normal. Contusion to forehead resolved with only minor residual abrasion.             Assessment/Plan:     1. Forehead contusion, subsequent encounter    Discharged MMI

## 2018-11-04 NOTE — LETTER
[address] [place] [address]  Phone:  [phone] - Fax:  [phone]   [facility] Progress Report and Disability Certification  Date of Service: 11/4/2018   No Show:  No  Date / Time of Next Visit:     Claim Information   Patient Name: Maricruz Archuleta  Claim Number:     Employer:   [employer] Date of Injury: 11/1/2018     Insurer / TPA: [employer]  ID / SSN:     Occupation: Dental Assistant  Diagnosis: The encounter diagnosis was Forehead contusion, subsequent encounter.    Medical Information   Related to Industrial Injury? Yes    Subjective Complaints:  DOI 11/1/18: Walked into cabinet while powering off X-ray machine. No LOC. No dizziness, lightheadedness, visual changes. No focal weakness. No previous head injury or concussion. Swelling improved. Pt not currently on any medication. Pt reported dull headache one day after head injury that quickly resolved. She reports 100% improvement since injury.   Objective Findings: Neurological: She is alert and oriented to person, place, and time. She has normal strength. Coordination and gait normal. Contusion to forehead resolved with only minor residual abrasion.         Pre-Existing Condition(s):     Assessment:   Condition Improved    Status: Discharged /  MMI  Permanent Disability:No    Plan:      Diagnostics:      Comments:       Disability Information   Status: Released to Full Duty    From:     Through:   Restrictions are:     Physical Restrictions   Sitting:    Standing:    Stooping:    Bending:      Squatting:    Walking:    Climbing:    Pushing:      Pulling:    Other:    Reaching Above Shoulder (L):   Reaching Above Shoulder (R):       Reaching Below Shoulder (L):    Reaching Below Shoulder (R):      Not to exceed Weight Limits   Carrying(hrs):   Weight Limit(lb):   Lifting(hrs):   Weight  Limit(lb):     Comments:      Repetitive Actions   Hands: i.e.  Fine Manipulations from Grasping:     Feet: i.e. Operating Foot Controls:     Driving / Operate Machinery:     Physician Name: TITO Mojica Physician Signature: BARTOLO Pitt e-Signature: Dr. Paddy Lao, Medical Director   Clinic Name / Location: [address] [address] Clinic Phone Number: Dept: [phone]   Appointment Time: 9:45 Am Visit Start Time: 9:52 AM   Check-In Time:  9:34 Am Visit Discharge Time:  11:24am   Original-Treating Physician or Chiropractor    Page 2-Insurer/TPA    Page 3-Employer    Page 4-Employee

## 2019-04-15 ENCOUNTER — HOSPITAL ENCOUNTER (OUTPATIENT)
Dept: LAB | Facility: MEDICAL CENTER | Age: 34
End: 2019-04-15
Attending: PHYSICIAN ASSISTANT
Payer: COMMERCIAL

## 2019-04-15 PROCEDURE — 87624 HPV HI-RISK TYP POOLED RSLT: CPT

## 2019-04-15 PROCEDURE — 88175 CYTOPATH C/V AUTO FLUID REDO: CPT

## 2019-04-19 LAB
CYTOLOGY REG CYTOL: NORMAL
HPV HR 12 DNA CVX QL NAA+PROBE: NEGATIVE
HPV16 DNA SPEC QL NAA+PROBE: NEGATIVE
HPV18 DNA SPEC QL NAA+PROBE: NEGATIVE
SPECIMEN SOURCE: NORMAL

## 2019-06-11 NOTE — PROGRESS NOTES
Detail Level: Detailed Subjective:   Maricruz Archuleta is a 33 y.o. female who presents for Head Injury (patient walked into a cabinet and hit her head on the top left)    DOI 11/1/18: Walked into cabinet while powering off X-ray machine today around 11 AM. No LOC. No dizziness, lightheadedness, visual changes. No focal weakness. No previous head injury or concussion. Pt applied cold pack. Area immediately swelled. Pt not currently on any medication. Takes melatonin and a multivitamin intermittently no other medication or supplement. Does not have a second job. Pt currently has a dull headache that feels like pressure.    HPI  Review of Systems   Constitutional: Negative for chills, fever and malaise/fatigue.   Eyes: Negative for blurred vision and double vision.   Respiratory: Negative for cough.    Gastrointestinal: Negative for abdominal pain, constipation, diarrhea, nausea and vomiting.   Neurological: Positive for headaches. Negative for dizziness, tingling, tremors, sensory change, speech change, focal weakness, seizures, loss of consciousness and weakness.   All other systems reviewed and are negative.      PMH:  has a past medical history of Bowel habit changes; Bronchitis; and Tuberculosis (2015).  MEDS:   Current Outpatient Prescriptions:   •  Fluticasone Propionate (FLONASE ALLERGY RELIEF NA), Spray  in nose., Disp: , Rfl:   ALLERGIES:   Allergies   Allergen Reactions   • Food      Red vinegar hives on skin if she touches it   • Other Environmental      Seasonal allergies     SURGHX:   Past Surgical History:   Procedure Laterality Date   • HEMORRHOIDECTOMY N/A 3/4/2016    Procedure: HEMORRHOIDECTOMY;  Surgeon: Kosta Rodrigues M.D.;  Location: SURGERY [facility];  Service:    • ANAL FISTULECTOMY N/A 3/4/2016    Procedure: ANAL FISTULECTOMY;  Surgeon: oKsta Rodrigues M.D.;  Location: SURGERY [facility];  Service:    • FISSURECTOMY N/A 3/4/2016    Procedure: FISSURECTOMY Possible;  Surgeon: Kosta Rodrigues  M.D.;  Location: SURGERY [facility];  Service:    • PB REMOVAL OF TONSILS,<13 Y/O      at 13yrs of age     SOCHX:  reports that she has quit smoking. She has never used smokeless tobacco. She reports that she does not drink alcohol or use drugs.  History reviewed. No pertinent family history.     Objective:   BP (!) 98/60   Pulse 60   Temp 37.2 °C (99 °F)   Ht 1.524 m (5')   Wt 46.7 kg (103 lb)   SpO2 98%   BMI 20.12 kg/m²     Physical Exam   Constitutional: She is oriented to person, place, and time. She appears well-developed and well-nourished. No distress.   HENT:   Head: Normocephalic and atraumatic.       Eyes: Pupils are equal, round, and reactive to light. Conjunctivae and EOM are normal.   Neck: Neck supple.   Cardiovascular: Normal rate and regular rhythm.    Pulmonary/Chest: Effort normal and breath sounds normal.   Neurological: She is alert and oriented to person, place, and time. She has normal strength. She is not disoriented. She displays no atrophy and no tremor. No cranial nerve deficit or sensory deficit. She exhibits normal muscle tone. She displays a negative Romberg sign. She displays no seizure activity. Coordination and gait normal.   Skin: Skin is warm and dry.   Psychiatric: She has a normal mood and affect. Her behavior is normal.   Vitals reviewed.      Assessment/Plan:     1. Forehead contusion, initial encounter     2. Injury of head, initial encounter       Patient advised to ice the area and start ibuprofen 600 mg every 8 hours.  Consider sleep with head of bed elevated.  Reviewed handout on contusion.  Monitor for signs of concussion consider decreasing screen time if full headache persists.  Handout on concussion provided and red flags reviewed.  Strict emergency room precautions discussed.  Follow-up in  in 3 days. Patient appears understanding of information.

## 2020-11-25 ENCOUNTER — HOSPITAL ENCOUNTER (OUTPATIENT)
Dept: LAB | Facility: MEDICAL CENTER | Age: 35
End: 2020-11-25
Attending: FAMILY MEDICINE
Payer: COMMERCIAL

## 2020-11-25 PROCEDURE — U0003 INFECTIOUS AGENT DETECTION BY NUCLEIC ACID (DNA OR RNA); SEVERE ACUTE RESPIRATORY SYNDROME CORONAVIRUS 2 (SARS-COV-2) (CORONAVIRUS DISEASE [COVID-19]), AMPLIFIED PROBE TECHNIQUE, MAKING USE OF HIGH THROUGHPUT TECHNOLOGIES AS DESCRIBED BY CMS-2020-01-R: HCPCS

## 2020-11-25 PROCEDURE — C9803 HOPD COVID-19 SPEC COLLECT: HCPCS

## 2020-11-26 LAB
COVID ORDER STATUS COVID19: NORMAL
SARS-COV-2 RNA RESP QL NAA+PROBE: NOTDETECTED
SPECIMEN SOURCE: NORMAL

## 2022-02-23 ENCOUNTER — HOSPITAL ENCOUNTER (OUTPATIENT)
Dept: LAB | Facility: MEDICAL CENTER | Age: 37
End: 2022-02-23
Attending: STUDENT IN AN ORGANIZED HEALTH CARE EDUCATION/TRAINING PROGRAM
Payer: COMMERCIAL

## 2022-02-23 LAB
ALBUMIN SERPL BCP-MCNC: 4.8 G/DL (ref 3.2–4.9)
ALBUMIN/GLOB SERPL: 1.7 G/DL
ALP SERPL-CCNC: 58 U/L (ref 30–99)
ALT SERPL-CCNC: 16 U/L (ref 2–50)
ANION GAP SERPL CALC-SCNC: 10 MMOL/L (ref 7–16)
AST SERPL-CCNC: 26 U/L (ref 12–45)
BASOPHILS # BLD AUTO: 0.5 % (ref 0–1.8)
BASOPHILS # BLD: 0.04 K/UL (ref 0–0.12)
BILIRUB SERPL-MCNC: 0.4 MG/DL (ref 0.1–1.5)
BUN SERPL-MCNC: 14 MG/DL (ref 8–22)
CALCIUM SERPL-MCNC: 9.6 MG/DL (ref 8.5–10.5)
CHLORIDE SERPL-SCNC: 103 MMOL/L (ref 96–112)
CO2 SERPL-SCNC: 26 MMOL/L (ref 20–33)
CREAT SERPL-MCNC: 1.09 MG/DL (ref 0.5–1.4)
EOSINOPHIL # BLD AUTO: 0.11 K/UL (ref 0–0.51)
EOSINOPHIL NFR BLD: 1.3 % (ref 0–6.9)
ERYTHROCYTE [DISTWIDTH] IN BLOOD BY AUTOMATED COUNT: 44.8 FL (ref 35.9–50)
GLOBULIN SER CALC-MCNC: 2.9 G/DL (ref 1.9–3.5)
GLUCOSE SERPL-MCNC: 97 MG/DL (ref 65–99)
HCT VFR BLD AUTO: 41 % (ref 37–47)
HGB BLD-MCNC: 13.7 G/DL (ref 12–16)
IMM GRANULOCYTES # BLD AUTO: 0.01 K/UL (ref 0–0.11)
IMM GRANULOCYTES NFR BLD AUTO: 0.1 % (ref 0–0.9)
LYMPHOCYTES # BLD AUTO: 3.63 K/UL (ref 1–4.8)
LYMPHOCYTES NFR BLD: 44.3 % (ref 22–41)
MCH RBC QN AUTO: 31.9 PG (ref 27–33)
MCHC RBC AUTO-ENTMCNC: 33.4 G/DL (ref 33.6–35)
MCV RBC AUTO: 95.3 FL (ref 81.4–97.8)
MONOCYTES # BLD AUTO: 0.36 K/UL (ref 0–0.85)
MONOCYTES NFR BLD AUTO: 4.4 % (ref 0–13.4)
NEUTROPHILS # BLD AUTO: 4.05 K/UL (ref 2–7.15)
NEUTROPHILS NFR BLD: 49.4 % (ref 44–72)
NRBC # BLD AUTO: 0 K/UL
NRBC BLD-RTO: 0 /100 WBC
PLATELET # BLD AUTO: 271 K/UL (ref 164–446)
PMV BLD AUTO: 10.2 FL (ref 9–12.9)
POTASSIUM SERPL-SCNC: 4.2 MMOL/L (ref 3.6–5.5)
PROT SERPL-MCNC: 7.7 G/DL (ref 6–8.2)
RBC # BLD AUTO: 4.3 M/UL (ref 4.2–5.4)
SODIUM SERPL-SCNC: 139 MMOL/L (ref 135–145)
WBC # BLD AUTO: 8.2 K/UL (ref 4.8–10.8)

## 2022-02-23 PROCEDURE — 85025 COMPLETE CBC W/AUTO DIFF WBC: CPT

## 2022-02-23 PROCEDURE — 36415 COLL VENOUS BLD VENIPUNCTURE: CPT

## 2022-02-23 PROCEDURE — 80053 COMPREHEN METABOLIC PANEL: CPT

## 2022-06-13 ENCOUNTER — HOSPITAL ENCOUNTER (OUTPATIENT)
Facility: MEDICAL CENTER | Age: 37
End: 2022-06-13
Attending: FAMILY MEDICINE
Payer: COMMERCIAL

## 2022-06-13 ENCOUNTER — OFFICE VISIT (OUTPATIENT)
Dept: URGENT CARE | Facility: CLINIC | Age: 37
End: 2022-06-13
Payer: COMMERCIAL

## 2022-06-13 VITALS
HEART RATE: 97 BPM | WEIGHT: 110 LBS | HEIGHT: 59 IN | DIASTOLIC BLOOD PRESSURE: 64 MMHG | SYSTOLIC BLOOD PRESSURE: 98 MMHG | RESPIRATION RATE: 14 BRPM | TEMPERATURE: 100 F | BODY MASS INDEX: 22.18 KG/M2 | OXYGEN SATURATION: 99 %

## 2022-06-13 DIAGNOSIS — J02.9 PHARYNGITIS, UNSPECIFIED ETIOLOGY: ICD-10-CM

## 2022-06-13 DIAGNOSIS — Z20.822 SUSPECTED COVID-19 VIRUS INFECTION: ICD-10-CM

## 2022-06-13 DIAGNOSIS — R11.0 NAUSEA: ICD-10-CM

## 2022-06-13 DIAGNOSIS — R50.9 FEVER, UNSPECIFIED FEVER CAUSE: ICD-10-CM

## 2022-06-13 LAB
FLUAV+FLUBV AG SPEC QL IA: NEGATIVE
INT CON NEG: NORMAL
INT CON NEG: NORMAL
INT CON POS: NORMAL
INT CON POS: NORMAL
S PYO AG THROAT QL: NEGATIVE

## 2022-06-13 PROCEDURE — U0005 INFEC AGEN DETEC AMPLI PROBE: HCPCS

## 2022-06-13 PROCEDURE — 87804 INFLUENZA ASSAY W/OPTIC: CPT | Performed by: FAMILY MEDICINE

## 2022-06-13 PROCEDURE — 99203 OFFICE O/P NEW LOW 30 MIN: CPT | Mod: CS | Performed by: FAMILY MEDICINE

## 2022-06-13 PROCEDURE — 87880 STREP A ASSAY W/OPTIC: CPT | Performed by: FAMILY MEDICINE

## 2022-06-13 PROCEDURE — U0003 INFECTIOUS AGENT DETECTION BY NUCLEIC ACID (DNA OR RNA); SEVERE ACUTE RESPIRATORY SYNDROME CORONAVIRUS 2 (SARS-COV-2) (CORONAVIRUS DISEASE [COVID-19]), AMPLIFIED PROBE TECHNIQUE, MAKING USE OF HIGH THROUGHPUT TECHNOLOGIES AS DESCRIBED BY CMS-2020-01-R: HCPCS

## 2022-06-13 RX ORDER — ONDANSETRON 4 MG/1
4 TABLET, FILM COATED ORAL EVERY 4 HOURS PRN
Qty: 10 TABLET | Refills: 0 | Status: SHIPPED | OUTPATIENT
Start: 2022-06-13 | End: 2022-06-16

## 2022-06-13 ASSESSMENT — FIBROSIS 4 INDEX: FIB4 SCORE: 0.91

## 2022-06-13 ASSESSMENT — ENCOUNTER SYMPTOMS
SHORTNESS OF BREATH: 0
NAUSEA: 1
FEVER: 1
DIZZINESS: 0
CHILLS: 1
VOMITING: 0
MYALGIAS: 0
SORE THROAT: 1

## 2022-06-13 NOTE — PROGRESS NOTES
Subjective:   Maricruz Archuleta is a 36 y.o. female who presents for Chills (X5days, Diarrhea, nausea, sore throat, fever)        Chills  Chronicity: For sore throat, nausea, decreased appetite, intermittent loose stools over the past 5 days. The current episode started in the past 7 days (5 days). The problem occurs intermittently. The problem has been unchanged. Associated symptoms include chills, a fever, nausea and a sore throat. Pertinent negatives include no myalgias, rash or vomiting. Associated symptoms comments: There has been community-wide COVID-19 exposure, the patient denies known direct COVID-19 exposure  . She has tried rest and drinking for the symptoms. The treatment provided no relief.     PMH:  has a past medical history of Bowel habit changes, Bronchitis, and Tuberculosis (2015).  MEDS:   Current Outpatient Medications:   •  ondansetron (ZOFRAN) 4 MG Tab tablet, Take 1 Tablet by mouth every four hours as needed for Nausea/Vomiting for up to 3 days., Disp: 10 Tablet, Rfl: 0  •  Fluticasone Propionate (FLONASE ALLERGY RELIEF NA), Spray  in nose., Disp: , Rfl:   ALLERGIES:   Allergies   Allergen Reactions   • Food      Red vinegar hives on skin if she touches it   • Other Environmental      Seasonal allergies     SURGHX:   Past Surgical History:   Procedure Laterality Date   • HEMORRHOIDECTOMY N/A 3/4/2016    Procedure: HEMORRHOIDECTOMY;  Surgeon: Kosta Rodrigeus M.D.;  Location: [facility];  Service:    • ANAL FISTULECTOMY N/A 3/4/2016    Procedure: ANAL FISTULECTOMY;  Surgeon: Kosta Rodrigues M.D.;  Location: SURGERY [facility];  Service:    • FISSURECTOMY N/A 3/4/2016    Procedure: FISSURECTOMY Possible;  Surgeon: Kosta Rodrigues M.D.;  Location: SURGERY [facility];  Service:    • ID REMOVAL OF TONSILS,<11 Y/O      at 13yrs of age     SOCHX:  reports that she has quit smoking. She has never used smokeless tobacco. She reports previous alcohol use. She reports that  "she does not use drugs.  FH: History reviewed. No pertinent family history.  Review of Systems   Constitutional: Positive for chills and fever.   HENT: Positive for sore throat.    Respiratory: Negative for shortness of breath.    Gastrointestinal: Positive for nausea. Negative for vomiting.   Musculoskeletal: Negative for myalgias.   Skin: Negative for rash.   Neurological: Negative for dizziness.        Objective:   BP (!) 98/64   Pulse 97   Temp 37.8 °C (100 °F) (Temporal)   Resp 14   Ht 1.499 m (4' 11\")   Wt 49.9 kg (110 lb)   LMP 06/12/2022 (Exact Date)   SpO2 99%   Breastfeeding No   BMI 22.22 kg/m²   Physical Exam  Vitals and nursing note reviewed.   Constitutional:       General: She is not in acute distress.     Appearance: She is well-developed.   HENT:      Head: Normocephalic and atraumatic.      Right Ear: External ear normal.      Left Ear: External ear normal.      Nose: Rhinorrhea present.      Mouth/Throat:      Mouth: Mucous membranes are moist.      Pharynx: Posterior oropharyngeal erythema present.   Eyes:      Conjunctiva/sclera: Conjunctivae normal.   Cardiovascular:      Rate and Rhythm: Normal rate.   Pulmonary:      Effort: Pulmonary effort is normal. No respiratory distress.      Breath sounds: Normal breath sounds. No wheezing or rhonchi.   Abdominal:      General: There is no distension.   Musculoskeletal:         General: Normal range of motion.   Skin:     General: Skin is warm and dry.   Neurological:      General: No focal deficit present.      Mental Status: She is alert and oriented to person, place, and time. Mental status is at baseline.      Gait: Gait (gait at baseline) normal.   Psychiatric:         Judgment: Judgment normal.     Point-of-care rapid strep: Negative    POCT influenza: negative         Assessment/Plan:   1. Fever, unspecified fever cause  - POCT Influenza A/B    2. Pharyngitis, unspecified etiology  - POCT Rapid Strep A    3. Suspected COVID-19 virus " infection  - SARS-CoV-2 PCR (24 hour In-House): Collect NP swab in VTM; Future    4. Nausea  - ondansetron (ZOFRAN) 4 MG Tab tablet; Take 1 Tablet by mouth every four hours as needed for Nausea/Vomiting for up to 3 days.  Dispense: 10 Tablet; Refill: 0        Medical Decision Making/Course:  In the course of preparing for this visit with review of the pertinent past medical history, recent and past clinic visits, current medications, and performing chart, immunization, medical history and medication reconciliation, and in the further course of obtaining the current history pertinent to the clinic visit today, performing an exam and evaluation, ordering and independently evaluating labs, tests including SARS CoV-2 by PCR testing   , and/or procedures, prescribing any recommended new medications as noted above, providing any pertinent counseling and education and recommending further coordination of care, at least  32 minutes of total time were spent during this encounter.      Discussed close monitoring, return precautions, and supportive measures of maintaining adequate fluid hydration and caloric intake, relative rest and symptom management as needed for pain and/or fever.    Differential diagnosis, natural history, supportive care, and indications for immediate follow-up discussed.     Advised the patient to follow-up with the primary care physician for recheck, reevaluation, and consideration of further management.    Please note that this dictation was created using voice recognition software. I have worked with consultants from the vendor as well as technical experts from [facility] [employer] to optimize the interface. I have made every reasonable attempt to correct obvious errors, but I expect that there are errors of grammar and possibly content that I did not discover before finalizing the note.

## 2022-06-13 NOTE — PATIENT INSTRUCTIONS
INSTRUCTIONS FOR COVID-19 OR ANY OTHER INFECTIOUS RESPIRATORY ILLNESSES    Symptoms of viral acute respiratory illnesses (e.g COVID-19, Influenza, RSV etc.) may include: cough, shortness of breath or difficulty breathing, fever or chills, muscle or body aches, headache, sore throat, congestion or runny nose, nausea or vomiting, diarrhea, or new loss of taste or smell. Symptoms can range from mild to severe and may appear up to two weeks after exposure to infectious virus.    The practice of self-isolation and quarantine helps protect the public and your family by  preventing exposure to people who have or may have a contagious disease. Please follow the prevention steps below:    WHEN TO STOP ISOLATION: Persons with COVID-19 or any other infectious respiratory illness who have symptoms and were advised to care for themselves at home may discontinue home isolation under the following conditions:  At least 24 hours have passed since recovery defined as resolution of fever without the use of fever-reducing medications; AND,  Improvement in respiratory symptoms (e.g., cough, shortness of breath); AND,  At least 5 days have passed since symptoms first appeared. Immunocompromised* individual need to isolate for 10 days.  Wear a well-fitting mask for an additional 5 days anytime you are away from your home or around other people. If you are unable to wear one, maintain a minimum of 6 feet distancing from others.    *Definition of immunocompromised   1. Active treatment for solid tumor or hematologic malignancy  2. Receipt of solid organ transplant and taking immunosuppressant  3. Moderate or severe primary immunodeficiency  4. Advanced or untreated HIV infection  5. Active treatment with high-dose corticosteroids, chemotherapy, TNF blockers or other biological agent    MONITOR YOUR SYMPTOMS: If your illness is worsening, seek prompt medical attention.   ACTIVITY RESTRICTION: restrict activities outside your home, except  for getting medical care. Do not go to work, school, or public areas. Avoid using public transportation, ride-sharing, or taxis.    LIVING ENVIRONMENT: Stay in a separate room from other people and pets. If possible, use a separate bathroom and avoid sharing household items. Any items used should be washed thoroughly with soap and water. Clean all “high-touch” surfaces every day. Use a household cleaning spray or wipe, according to the label instructions. High touch surfaces include (but are not limited to) counters, tabletops, doorknobs, bathroom fixtures, toilets, phones, keyboards, tablets, and bedside tables.     HAND WASHING: Frequently wash hands with soap and water for at least 20 seconds, especially after blowing your nose, coughing, or sneezing; going to the bathroom; before and after interacting with pets; and before and after eating or preparing food. If hands are visibly dirty use soap and water. If soap and water are not available, use an alcohol-based hand  with at least 60% alcohol. Avoid touching your eyes, nose, and mouth with unwashed hands. Cover your coughs and sneezes with a tissue. Throw used tissues in a lined trash can. Immediately wash your hands.    ACTIVE/FACILITATED SELF-MONITORING: Follow instructions provided by your local health department or health professionals, as appropriate. When working with your local health department check their available hours.    [facility]   Phone Number   [place] [phone]   [facility] [phone]   [place] [address]   [place] [phone]

## 2023-03-04 ENCOUNTER — APPOINTMENT (OUTPATIENT)
Dept: LAB | Facility: MEDICAL CENTER | Age: 38
End: 2023-03-04